# Patient Record
Sex: MALE | Race: WHITE | Employment: UNEMPLOYED | URBAN - METROPOLITAN AREA
[De-identification: names, ages, dates, MRNs, and addresses within clinical notes are randomized per-mention and may not be internally consistent; named-entity substitution may affect disease eponyms.]

---

## 2019-12-02 ENCOUNTER — APPOINTMENT (OUTPATIENT)
Dept: GENERAL RADIOLOGY | Facility: CLINIC | Age: 44
End: 2019-12-02
Attending: FAMILY MEDICINE
Payer: COMMERCIAL

## 2019-12-02 ENCOUNTER — HOSPITAL ENCOUNTER (EMERGENCY)
Facility: CLINIC | Age: 44
Discharge: HOME | End: 2019-12-02
Attending: FAMILY MEDICINE | Admitting: FAMILY MEDICINE
Payer: COMMERCIAL

## 2019-12-02 VITALS
OXYGEN SATURATION: 96 % | DIASTOLIC BLOOD PRESSURE: 83 MMHG | HEART RATE: 103 BPM | BODY MASS INDEX: 35.78 KG/M2 | WEIGHT: 270 LBS | HEIGHT: 73 IN | TEMPERATURE: 97.8 F | RESPIRATION RATE: 16 BRPM | SYSTOLIC BLOOD PRESSURE: 161 MMHG

## 2019-12-02 DIAGNOSIS — M70.22 OLECRANON BURSITIS OF LEFT ELBOW: ICD-10-CM

## 2019-12-02 PROCEDURE — 99284 EMERGENCY DEPT VISIT MOD MDM: CPT | Mod: Z6 | Performed by: FAMILY MEDICINE

## 2019-12-02 PROCEDURE — 25000128 H RX IP 250 OP 636: Performed by: FAMILY MEDICINE

## 2019-12-02 PROCEDURE — 90715 TDAP VACCINE 7 YRS/> IM: CPT | Performed by: FAMILY MEDICINE

## 2019-12-02 PROCEDURE — 73070 X-RAY EXAM OF ELBOW: CPT | Mod: LT

## 2019-12-02 PROCEDURE — 99284 EMERGENCY DEPT VISIT MOD MDM: CPT | Mod: 25

## 2019-12-02 PROCEDURE — 90471 IMMUNIZATION ADMIN: CPT

## 2019-12-02 RX ORDER — CEPHALEXIN 500 MG/1
500 CAPSULE ORAL 4 TIMES DAILY
Qty: 40 CAPSULE | Refills: 0 | Status: SHIPPED | OUTPATIENT
Start: 2019-12-02 | End: 2019-12-12

## 2019-12-02 RX ADMIN — CLOSTRIDIUM TETANI TOXOID ANTIGEN (FORMALDEHYDE INACTIVATED), CORYNEBACTERIUM DIPHTHERIAE TOXOID ANTIGEN (FORMALDEHYDE INACTIVATED), BORDETELLA PERTUSSIS TOXOID ANTIGEN (GLUTARALDEHYDE INACTIVATED), BORDETELLA PERTUSSIS FILAMENTOUS HEMAGGLUTININ ANTIGEN (FORMALDEHYDE INACTIVATED), BORDETELLA PERTUSSIS PERTACTIN ANTIGEN, AND BORDETELLA PERTUSSIS FIMBRIAE 2/3 ANTIGEN 0.5 ML: 5; 2; 2.5; 5; 3; 5 INJECTION, SUSPENSION INTRAMUSCULAR at 23:15

## 2019-12-02 ASSESSMENT — MIFFLIN-ST. JEOR: SCORE: 2158.59

## 2019-12-02 NOTE — ED AVS SNAPSHOT
[facility] Emergency Department  [address] [address]  Phone:  [phone]  Fax:  [phone]                                    Sergo Arechiga   MRN: [id number]    Department:  [facility] Emergency Department   Date of Visit:  12/2/2019           After Visit Summary Signature Page    I have received my discharge instructions, and my questions have been answered. I have discussed any challenges I see with this plan with the nurse or doctor.    ..........................................................................................................................................  Patient/Patient Representative Signature      ..........................................................................................................................................  Patient Representative Print Name and Relationship to Patient    ..................................................               ................................................  Date                                   Time    ..........................................................................................................................................  Reviewed by Signature/Title    ...................................................              ..............................................  Date                                               Time          22EPIC Rev 08/18

## 2019-12-03 NOTE — DISCHARGE INSTRUCTIONS
Ibuprofen 400 mg 3 times daily with food for any pain.  Keflex 500 mg 4 times daily x10 days.  If redness increases or increased discomfort please return to the emergency department.

## 2019-12-03 NOTE — ED PROVIDER NOTES
History     Chief Complaint   Patient presents with     Arm Pain     started hurting about 4 days ago, thinks he hit it while he was sleeping     HPI  Sergo Arechiga is a 44 year old male, past medical history is significant for alcohol abuse, obesity, presents to the emergency department with concerns of about 4 days of left elbow/arm pain.  History is obtained from the patient who states that he struck his elbow on something fairly forcefully about 4 days ago and has noticed more tenderness slight swelling and no redness diffusely in the left ulnar aspect forearm by description over the last 48 hours.  No fever chills or sweats.  He notes no limitation in range of motion, no locking, clicking, cracking.  Is been applying onions and minerals to it has taken nothing orally for pain.  This combination does not seem to be helping.  No previous fractures.  Right-hand-dominant.    Allergies:  No Known Allergies    Problem List:    There are no active problems to display for this patient.       Past Medical History:    Past Medical History:   Diagnosis Date     Alcohol abuse, unspecified      Obesity, unspecified        Past Surgical History:    Past Surgical History:   Procedure Laterality Date     SURGICAL HISTORY OF -   9/2/1998    Open reduction, internal fixation, right syndesmosis Right ankle fracture     SURGICAL HISTORY OF -   8/26/1998    Open reduction, internal fixation of maisonneuve fracture and placement of syndesmotic screws. (Right ankle fracture, Maisonneuve type)       Family History:    No family history on file.    Social History:  Marital Status:   [2]  Social History     Tobacco Use     Smoking status: Former Smoker     Packs/day: 1.00     Years: 24.00     Pack years: 24.00     Last attempt to quit: 8/16/2016     Years since quitting: 3.2     Smokeless tobacco: Former User   Substance Use Topics     Alcohol use: Not on file     Comment: quit alcohol at age 25     Drug use: No     "    Medications:    No current outpatient medications on file.        Review of Systems   All other systems reviewed and are negative.      Physical Exam   BP: (!) 161/83  Pulse: 103  Temp: 97.8  F (36.6  C)  Resp: 16  Height: 185.4 cm (6' 1\")  Weight: 122.5 kg (270 lb)(stated)  SpO2: 96 %      Physical Exam  Vitals signs and nursing note reviewed.   Constitutional:       Appearance: Normal appearance. He is obese.   Musculoskeletal:        Arms:    Neurological:      Mental Status: He is alert.         ED Course        Procedures               Critical Care time:  none               Results for orders placed or performed during the hospital encounter of 12/02/19 (from the past 24 hour(s))   Elbow XR, 2 view, left    Narrative    EXAM: XR ELBOW LT 2 VW  LOCATION: [facility]  DATE/TIME: 12/2/2019 10:33 PM    INDICATION: Pain and swelling.  COMPARISON: None.      Impression    IMPRESSION: No fracture or dislocation. No joint effusion. Small osteophyte at the olecranon process.   11:02 PM  Results reviewed with the patient and his wife and answered their questions.    Medications - No data to display    Assessments & Plan (with Medical Decision Making)   44-year-old male past medical history reviewed as above who presents the emergency department with concerns of trauma to the left elbow area and development of faint erythema and warmth over the forearm as described historically and on exam.  The patient has no evidence for septic joint.  Full normal pain-free range of motion however there is redness over the distribution of the olecranon bursa.  No regional lymphadenopathy.  The patient has no systemic symptoms.  I think this likely represents some olecranon bursitis/cellulitis and I plan to place the patient on oral antibiotics x10 days.  He has strict instructions to return to the emergency department if worse or changes.      Disclaimer: This note consists of symbols derived from keyboarding, " dictation and/or voice recognition software. As a result, there may be errors in the script that have gone undetected. Please consider this when interpreting information found in this chart.      I have reviewed the nursing notes.    I have reviewed the findings, diagnosis, plan and need for follow up with the patient.       New Prescriptions    No medications on file       Final diagnoses:   None       12/2/2019   [facility] EMERGENCY DEPARTMENT     Tim Nicole MD  12/02/19 [id number]

## 2020-10-12 ENCOUNTER — VIRTUAL VISIT (OUTPATIENT)
Dept: FAMILY MEDICINE | Facility: CLINIC | Age: 45
End: 2020-10-12
Payer: COMMERCIAL

## 2020-10-12 DIAGNOSIS — F90.0 ATTENTION DEFICIT HYPERACTIVITY DISORDER (ADHD), PREDOMINANTLY INATTENTIVE TYPE: Primary | ICD-10-CM

## 2020-10-12 PROCEDURE — 99203 OFFICE O/P NEW LOW 30 MIN: CPT | Mod: GT | Performed by: FAMILY MEDICINE

## 2020-10-12 RX ORDER — DEXTROAMPHETAMINE SACCHARATE, AMPHETAMINE ASPARTATE, DEXTROAMPHETAMINE SULFATE AND AMPHETAMINE SULFATE 2.5; 2.5; 2.5; 2.5 MG/1; MG/1; MG/1; MG/1
10 TABLET ORAL 2 TIMES DAILY
Qty: 60 TABLET | Refills: 0 | Status: SHIPPED | OUTPATIENT
Start: 2020-12-13 | End: 2020-12-28

## 2020-10-12 NOTE — PROGRESS NOTES
"Sergo Arechiga is a 44 year old male who is being evaluated via a billable video visit.      The patient has been notified of following:     \"This video visit will be conducted via a call between you and your physician/provider. We have found that certain health care needs can be provided without the need for an in-person physical exam.  This service lets us provide the care you need with a video conversation.  If a prescription is necessary we can send it directly to your pharmacy.  If lab work is needed we can place an order for that and you can then stop by our lab to have the test done at a later time.    Video visits are billed at different rates depending on your insurance coverage.  Please reach out to your insurance provider with any questions.    If during the course of the call the physician/provider feels a video visit is not appropriate, you will not be charged for this service.\"    Patient has given verbal consent for Video visit? Yes  How would you like to obtain your AVS? Not needed  If you are dropped from the video visit, the video invite should be resent to: Text to cell phone: [phone]  Will anyone else be joining your video visit? 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 He was diagnosed at age 8-9, was in special education..he had a learning disorder.  He is always going \" a mile a minute\". He remembers that it made him a zombie. His dad wouldn't let him take it.     So now he is hyper, can't concentrate, has lots of ideas.   His wife is tired of it. So he thought he would contact me   Doesn't drink or do drugs.   He thought he would try a low dose, it doesn't bother him as much as it bothers his friends or wife.     His mom has what " sounds like schizophrenia.     Video Start Time: 10:32 AM        Review of Systems .  ROS: 5 point ROS negative except as noted above in HPI, including Gen., Resp., CV, GI &  system review.       Objective           Vitals:  No vitals were obtained today due to virtual visit.    Physical Exam     GENERAL: Healthy, alert and no distress  EYES: Eyes grossly normal to inspection.  No discharge or erythema, or obvious scleral/conjunctival abnormalities.  RESP: No audible wheeze, cough, or visible cyanosis.  No visible retractions or increased work of breathing.    SKIN: Visible skin clear. No significant rash, abnormal pigmentation or lesions.  NEURO: Cranial nerves grossly intact.  Mentation and speech appropriate for age.  PSYCH: Mentation appears normal, affect normal/bright, judgement and insight intact, normal speech and appearance well-groomed, quite talkative        Assessment & Plan     Attention deficit hyperactivity disorder (ADHD), predominantly inattentive type  Quite apparent from visit today   he wants to try just a low dose  Start Adderal 10 mg bid  Recheck one month    No follow-ups on file.    Nely Bacon MD  [facility]      Video-Visit Details    Type of service:  Video Visit    Video End Time:10:51 AM    Originating Location (pt. Location): Home    Distant Location (provider location):  [facility]     Platform used for Video Visit: [employer]

## 2020-12-23 DIAGNOSIS — F90.0 ATTENTION DEFICIT HYPERACTIVITY DISORDER (ADHD), PREDOMINANTLY INATTENTIVE TYPE: ICD-10-CM

## 2020-12-28 RX ORDER — DEXTROAMPHETAMINE SACCHARATE, AMPHETAMINE ASPARTATE, DEXTROAMPHETAMINE SULFATE AND AMPHETAMINE SULFATE 2.5; 2.5; 2.5; 2.5 MG/1; MG/1; MG/1; MG/1
10 TABLET ORAL 2 TIMES DAILY
Qty: 60 TABLET | Refills: 0 | Status: SHIPPED | OUTPATIENT
Start: 2020-12-28

## 2020-12-28 NOTE — TELEPHONE ENCOUNTER
Please call He was suppose to see Dr Lizarraga after one month on the medication. So was due for visit with her in November. I will fill once but needs follow up

## 2020-12-28 NOTE — TELEPHONE ENCOUNTER
Left message on identified voice mail that Rx was sent ot [facility] pharmacy. He will need office or VV with Dr Bacon for next month's refill.

## 2020-12-28 NOTE — TELEPHONE ENCOUNTER
Needs to be resent to [facility] as Rx at [facility] can not be transferred

## 2021-02-19 DIAGNOSIS — F90.0 ATTENTION DEFICIT HYPERACTIVITY DISORDER (ADHD), PREDOMINANTLY INATTENTIVE TYPE: ICD-10-CM

## 2021-02-19 RX ORDER — DEXTROAMPHETAMINE SACCHARATE, AMPHETAMINE ASPARTATE, DEXTROAMPHETAMINE SULFATE AND AMPHETAMINE SULFATE 2.5; 2.5; 2.5; 2.5 MG/1; MG/1; MG/1; MG/1
TABLET ORAL
Qty: 60 TABLET | Refills: 0 | OUTPATIENT
Start: 2021-02-19

## 2021-02-19 NOTE — TELEPHONE ENCOUNTER
Refill for amphetamine-dextroamphetamine (ADDERALL) 10 MG tablet refused. Pt was supposed to follow up in Nov.    Lauren GANT RN, BSN

## 2021-03-22 ENCOUNTER — VIRTUAL VISIT (OUTPATIENT)
Dept: FAMILY MEDICINE | Facility: CLINIC | Age: 46
End: 2021-03-22
Payer: COMMERCIAL

## 2021-03-22 DIAGNOSIS — Z53.9 ERRONEOUS ENCOUNTER--DISREGARD: Primary | ICD-10-CM
